# Patient Record
(demographics unavailable — no encounter records)

---

## 2024-11-06 NOTE — END OF VISIT
[FreeTextEntry3] : All medical record entries made by the Scribe were at my, Jose Manuel Hughes MD, direction and personally dictated by me on 11/06/2024. I have reviewed the chart and agree that the record accurately reflects my personal performance of the history, physical exam, assessment and plan. I have also personally directed, reviewed, and agreed with the chart. [Time Spent: ___ minutes] : I have spent [unfilled] minutes of time on the encounter which excludes teaching and separately reported services.

## 2024-11-06 NOTE — PHYSICAL EXAM
[Ataxic] : not ataxic [de-identified] : Examination of the cervical spine reveals no midline or paraspinal tenderness to palpation. No cervical lymphadenopathy. Decreased range of motion with respect to flexion, extension, rotation, and lateral bending. Negative Spurlings. Negative Lhermitte's. Full range of motion bilateral shoulders without evidence of impingement. No instability of bilateral upper extremities.  Cranial nerves II through XII grossly intact. Intact sensation bilateral upper extremities. 5/5 deltoids biceps triceps wrist extensors wrist flexors finger flexors and hand intrinsics. 1+ biceps triceps and brachioradialis reflexes. Negative Parra's. 2+ radial pulse. Negative Tinel's over the cubital and carpal tunnel. No skin lesions on the right and left upper extremities. [de-identified] : AP lateral cervical x-rays with some spondylosis without instability or aggressive lesions

## 2024-11-06 NOTE — HISTORY OF PRESENT ILLNESS
[de-identified] : Mr. RASHEED PRATER  is a 46 year old male who presents to the office for a follow-up visit.  He is doing PT but his neck pain is persistent.

## 2024-11-06 NOTE — DISCUSSION/SUMMARY
[de-identified] : We discussed further treatment options. Unfortunately, physical therapy has not improved his symptoms. A cervical MRI was recommended, with follow-up scheduled afterwards.

## 2024-11-06 NOTE — ADDENDUM
[FreeTextEntry1] : I, Jeanette Carbajal, documented this note as a scribe on behalf of Jose Manuel Hughes MD. on 11/06/2024.

## 2025-02-05 NOTE — HISTORY OF PRESENT ILLNESS
Calm [de-identified] : Mr. RASHEED PRATER  is a 46 year old male who presents to the office for a follow-up visit.  He is here to review his MRI results.

## 2025-02-05 NOTE — PHYSICAL EXAM
[Ataxic] : not ataxic [de-identified] : Examination of the cervical spine reveals no midline or paraspinal tenderness to palpation. No cervical lymphadenopathy. Decreased range of motion with respect to flexion, extension, rotation, and lateral bending. Negative Spurlings. Negative Lhermitte's. Full range of motion bilateral shoulders without evidence of impingement. No instability of bilateral upper extremities.  Cranial nerves II through XII grossly intact. Intact sensation bilateral upper extremities. 5/5 deltoids biceps triceps wrist extensors wrist flexors finger flexors and hand intrinsics. 1+ biceps triceps and brachioradialis reflexes. Negative Parra's. 2+ radial pulse. Negative Tinel's over the cubital and carpal tunnel. No skin lesions on the right and left upper extremities. [de-identified] : AP lateral cervical x-rays with some spondylosis without instability or aggressive lesions  Review of his cervical spine MRI reveals a left-sided C5-6 disc herniation.

## 2025-02-05 NOTE — DISCUSSION/SUMMARY
[de-identified] : We discussed further treatment options. Symptomatology is overall improved with conservative measures. At this point, he would like to continue with conservative treatment. He will let me know of any changing or worsening of his symptoms.

## 2025-02-05 NOTE — END OF VISIT
[FreeTextEntry3] : All medical record entries made by the Scribe were at my, Jose Manuel Hughes MD, direction and personally dictated by me on 02/05/2025. I have reviewed the chart and agree that the record accurately reflects my personal performance of the history, physical exam, assessment and plan. I have also personally directed, reviewed, and agreed with the chart. [Time Spent: ___ minutes] : I have spent [unfilled] minutes of time on the encounter which excludes teaching and separately reported services.

## 2025-02-05 NOTE — ADDENDUM
[FreeTextEntry1] : I, Herb Villegas, documented this note as a scribe on behalf of Jose Manuel Hughes MD on 02/05/2025.

## 2025-07-28 NOTE — DISCUSSION/SUMMARY
[de-identified] : We discussed further treatment options.  At this point he will try course of physical therapy.  Follow-up in 6 weeks.

## 2025-07-28 NOTE — HISTORY OF PRESENT ILLNESS
[de-identified] : Mr. RASHEED PRATER  is a 46 year old male who presents to the office for a follow-up visit.  He is complaining of left lumbar pain for the past 2 months that is getting worse.  His neck symptoms are the same.